# Patient Record
Sex: FEMALE | Race: WHITE | NOT HISPANIC OR LATINO | Employment: OTHER | RURAL
[De-identification: names, ages, dates, MRNs, and addresses within clinical notes are randomized per-mention and may not be internally consistent; named-entity substitution may affect disease eponyms.]

---

## 2024-06-18 ENCOUNTER — OFFICE VISIT (OUTPATIENT)
Dept: CARDIOLOGY | Facility: CLINIC | Age: 64
End: 2024-06-18
Payer: COMMERCIAL

## 2024-06-18 VITALS
WEIGHT: 179 LBS | OXYGEN SATURATION: 96 % | SYSTOLIC BLOOD PRESSURE: 106 MMHG | HEART RATE: 74 BPM | DIASTOLIC BLOOD PRESSURE: 58 MMHG | BODY MASS INDEX: 31.71 KG/M2 | HEIGHT: 63 IN

## 2024-06-18 DIAGNOSIS — R07.2 PRECORDIAL CHEST PAIN: Primary | ICD-10-CM

## 2024-06-18 DIAGNOSIS — I10 HYPERTENSION, ESSENTIAL: ICD-10-CM

## 2024-06-18 DIAGNOSIS — R06.02 SHORTNESS OF BREATH: ICD-10-CM

## 2024-06-18 PROCEDURE — 99204 OFFICE O/P NEW MOD 45 MIN: CPT | Performed by: NURSE PRACTITIONER

## 2024-06-18 PROCEDURE — 93000 ELECTROCARDIOGRAM COMPLETE: CPT | Performed by: NURSE PRACTITIONER

## 2024-06-18 RX ORDER — VENLAFAXINE HYDROCHLORIDE 75 MG/1
1 CAPSULE, EXTENDED RELEASE ORAL DAILY
COMMUNITY
Start: 2024-06-10

## 2024-06-18 RX ORDER — LOSARTAN POTASSIUM 50 MG/1
1 TABLET ORAL DAILY
COMMUNITY

## 2024-06-18 RX ORDER — PANTOPRAZOLE SODIUM 40 MG/1
1 TABLET, DELAYED RELEASE ORAL 2 TIMES DAILY
COMMUNITY

## 2024-06-18 RX ORDER — SEMAGLUTIDE 0.68 MG/ML
INJECTION, SOLUTION SUBCUTANEOUS WEEKLY
COMMUNITY

## 2024-06-18 NOTE — ASSESSMENT & PLAN NOTE
Patient reports an episode of severe chest pressure that radiated to her neck 4 days ago.  She was evaluated in the ER, workup unremarkable.  She has a family history of CAD.  - Nuclear treadmill stress test to rule out ischemia.

## 2024-06-18 NOTE — PROGRESS NOTES
[facility] Provider Note     Date:   2024   Name: Osvaldo Herrera  :   1960  PCP: Martin Epps MD    Chief Complaint   Patient presents with   • CARDIAC CONSULT       Subjective     History of Present Illness  Osvaldo Herrera is a 64 y.o. female with past medical history of, HTN, acid reflux and migraines who presents today for new patient evaluation for chest pain.  Patient experienced a severe episode of chest pain that started suddenly 4 days ago.  The pain radiated up to her neck and lasted approximately 30 minutes.  She was evaluated at [facility] ER, workup was unremarkable.  She is here today for further cardiac evaluation.  She has no previous history of heart disease.  She has a family history of CAD, her father passed away while having coronary bypass surgery.  She also reports worsening fatigue, she feels like she has no energy despite sleeping well at night.  She also reports worsening shortness of breath.  She is an ex-smoker.  No recent cardiac testing.  EKG today shows sinus rhythm with a rate of 71 bpm, low QRS voltage in extremity leads.  Patient has not had any recurrence of chest pain since the ER visit 4 days ago.    No specialty comments available.     Reports Denies   Chest Pain [x] []   Shortness of Air [x] []   Palpitations [] [x]   Edema [] [x]   Dizziness [x] []   Syncope [] [x]       No Known Allergies    Current Outpatient Medications:   •  CeleBREX 200 MG capsule, Take 1 capsule by mouth Daily., Disp: , Rfl:   •  losartan (COZAAR) 50 MG tablet, Take 1 tablet by mouth Daily., Disp: , Rfl:   •  pantoprazole (PROTONIX) 40 MG EC tablet, Take 1 tablet by mouth 2 (Two) Times a Day., Disp: , Rfl:   •  Semaglutide,0.25 or 0.5MG/DOS, (Ozempic, 0.25 or 0.5 MG/DOSE,) 2 MG/3ML solution pen-injector, Inject  under the skin into the appropriate area as directed 1 (One) Time Per Week., Disp: , Rfl:   •  venlafaxine XR (EFFEXOR-XR) 75 MG 24 hr  "capsule, Take 1 capsule by mouth Daily., Disp: , Rfl:     Past Medical History:   Diagnosis Date   • Acid reflux    • Gall stones    • Migraine headache       Past Surgical History:   Procedure Laterality Date   • CHOLECYSTECTOMY  2010   • HYSTERECTOMY  1996   • WRIST SURGERY  2016     Family History   Problem Relation Age of Onset   • Cancer Mother    • Heart disease Father    • Heart attack Father    • Parkinsonism Brother      Social History     Socioeconomic History   • Marital status:    Tobacco Use   • Smoking status: Former     Types: Cigarettes     Passive exposure: Past   • Smokeless tobacco: Never   Vaping Use   • Vaping status: Never Used   Substance and Sexual Activity   • Alcohol use: Not Currently   • Drug use: Not Currently   • Sexual activity: Defer       Objective     Vital Signs:  /58   Pulse 74   Ht 160 cm (63\")   Wt 81.2 kg (179 lb)   SpO2 96%   BMI 31.71 kg/m²   Estimated body mass index is 31.71 kg/m² as calculated from the following:    Height as of this encounter: 160 cm (63\").    Weight as of this encounter: 81.2 kg (179 lb).       BMI is >= 30 and <35. (Class 1 Obesity). The following options were offered after discussion;: exercise counseling/recommendations      Physical Exam  Vitals reviewed.   Constitutional:       Appearance: Normal appearance.   HENT:      Head: Normocephalic.   Cardiovascular:      Rate and Rhythm: Normal rate and regular rhythm.      Heart sounds: Normal heart sounds.   Pulmonary:      Effort: Pulmonary effort is normal.      Breath sounds: Normal breath sounds.   Musculoskeletal:      Right lower leg: No edema.      Left lower leg: No edema.   Skin:     General: Skin is warm and dry.      Capillary Refill: Capillary refill takes less than 2 seconds.   Neurological:      General: No focal deficit present.      Mental Status: She is alert and oriented to person, place, and time.   Psychiatric:         Mood and Affect: Mood normal.         Behavior: " Behavior normal.           Recent hospitalization notes reviewed: ER notes from [facility] reviewed      ECG 12 Lead    Date/Time: 6/18/2024 10:26 AM  Performed by: Ann Ram APRN    Authorized by: Ann Ram APRN  Comparison: not compared with previous ECG   Previous ECG: no previous ECG available  Rhythm: sinus rhythm  Rate: normal  BPM: 71  Other findings: low voltage    Clinical impression: non-specific ECG           Assessment and Plan     Diagnoses and all orders for this visit:    1. Precordial chest pain (Primary)  Assessment & Plan:  Patient reports an episode of severe chest pressure that radiated to her neck 4 days ago.  She was evaluated in the ER, workup unremarkable.  She has a family history of CAD.  - Nuclear treadmill stress test to rule out ischemia.    Orders:  -     Stress Test With Myocardial Perfusion One Day; Future  -     Adult Transthoracic Echo Complete W/ Cont if Necessary Per Protocol; Future    2. Hypertension, essential  Assessment & Plan:  Hypertension is stable and controlled  Continue current treatment regimen.  Regular aerobic exercise.  Blood pressure will be reassessed in 6 months.      3. Shortness of breath  Assessment & Plan:  Worsening shortness of breath and fatigue.   -Nuclear treadmill stress test and echocardiogram to rule out cardiac etiology     Orders:  -     Stress Test With Myocardial Perfusion One Day; Future  -     Adult Transthoracic Echo Complete W/ Cont if Necessary Per Protocol; Future    Other orders  -     ECG 12 Lead        Recommendations: ER if symptoms increase and Report if any new/changing symptoms immediately          Follow Up  Return in about 4 weeks (around 7/16/2024) for cardiac testing results.  Patient was given instructions and counseling regarding her condition or for health maintenance advice. Please see specific information pulled into the AVS if appropriate.

## 2024-06-18 NOTE — ASSESSMENT & PLAN NOTE
Worsening shortness of breath and fatigue.   -Nuclear treadmill stress test and echocardiogram to rule out cardiac etiology

## 2024-06-20 ENCOUNTER — PATIENT ROUNDING (BHMG ONLY) (OUTPATIENT)
Dept: CARDIOLOGY | Facility: CLINIC | Age: 64
End: 2024-06-20
Payer: COMMERCIAL

## 2024-06-20 NOTE — PROGRESS NOTES
..My name is Janay Reno and I am the Referral Coordinator for [facility] [place].    I would like to thank you for being a loyal patient. If you do not mind I would like to ask you a few questions about your recent visit with us.  Please feel free to reply if you wish to provide us with feedback on your first visit with our practice.    First, could you tell me what went well with your recent visit?    Secondly, we are always looking for ways to make our patients' experiences even better.  Do you have any recommendations on ways we may improve?    Finally, overall were you satisfied with your first visit to us as a [facility] facility?    In the next few days, you will be receiving a Patient Experience Survey.      Thank you for taking the time to answer a few questions today.  I hope you have a good day.

## 2024-06-24 ENCOUNTER — OUTSIDE FACILITY SERVICE (OUTPATIENT)
Dept: CARDIOLOGY | Facility: CLINIC | Age: 64
End: 2024-06-24
Payer: COMMERCIAL

## 2024-06-25 ENCOUNTER — HOSPITAL ENCOUNTER (OUTPATIENT)
Dept: CARDIOLOGY | Facility: HOSPITAL | Age: 64
Discharge: HOME | End: 2024-06-25

## 2024-06-25 DIAGNOSIS — R07.2 PRECORDIAL CHEST PAIN: ICD-10-CM

## 2024-06-25 DIAGNOSIS — R06.02 SHORTNESS OF BREATH: ICD-10-CM

## 2024-07-09 ENCOUNTER — OFFICE VISIT (OUTPATIENT)
Dept: CARDIOLOGY | Facility: CLINIC | Age: 64
End: 2024-07-09
Payer: COMMERCIAL

## 2024-07-09 VITALS
WEIGHT: 186 LBS | HEIGHT: 63 IN | BODY MASS INDEX: 32.96 KG/M2 | OXYGEN SATURATION: 97 % | SYSTOLIC BLOOD PRESSURE: 118 MMHG | HEART RATE: 71 BPM | DIASTOLIC BLOOD PRESSURE: 76 MMHG

## 2024-07-09 DIAGNOSIS — R07.2 PRECORDIAL CHEST PAIN: Primary | ICD-10-CM

## 2024-07-09 DIAGNOSIS — I10 HYPERTENSION, ESSENTIAL: ICD-10-CM

## 2024-07-09 PROCEDURE — 99214 OFFICE O/P EST MOD 30 MIN: CPT | Performed by: NURSE PRACTITIONER

## 2024-07-09 RX ORDER — ELETRIPTAN HYDROBROMIDE 40 MG/1
40 TABLET, FILM COATED ORAL ONCE AS NEEDED
COMMUNITY
Start: 2024-07-05

## 2024-07-09 NOTE — PROGRESS NOTES
[facility] Provider Note     Date:   2024   Name: Osvaldo Herrera  :   1960  PCP: Martin Epps MD    Chief Complaint   Patient presents with    Follow-up     ECHO/MARGO RESULTS       Subjective     History of Present Illness  Osvaldo Herrera is a 64 y.o. female with past medical history of, HTN, acid reflux and migraines who presents today for follow-up after recent cardiac testing for chest pain.  She completed a nuclear stress test on 2024 that revealed no evidence of ischemia.  Echocardiogram today shows an LVEF of 61-65%, normal RVSP, mild mitral regurgitation and mild tricuspid regurgitation.  She reports that she had a very brief episode of chest discomfort since her last office visit, it was not as severe as her previous episode.  It only lasted approximately 20-30 seconds and then resolved.  She denies any new symptoms since her last office visit.  Results of cardiac testing reviewed with patient in detail.  Overall, she is doing well today with no new concerns at this time.      Nuclear stress test 2024-no scintigraphic evidence of ischemia.    Echocardiogram 2024-LVEF 61-65%.  Normal RVSP.  Mild mitral regurgitation and mild tricuspid regurgitation      No Known Allergies    Current Outpatient Medications:     CeleBREX 200 MG capsule, Take 1 capsule by mouth Daily., Disp: , Rfl:     eletriptan (RELPAX) 40 MG tablet, Take 1 tablet by mouth 1 (One) Time As Needed., Disp: , Rfl:     losartan (COZAAR) 50 MG tablet, Take 1 tablet by mouth Daily., Disp: , Rfl:     pantoprazole (PROTONIX) 40 MG EC tablet, Take 1 tablet by mouth 2 (Two) Times a Day., Disp: , Rfl:     Semaglutide,0.25 or 0.5MG/DOS, (Ozempic, 0.25 or 0.5 MG/DOSE,) 2 MG/3ML solution pen-injector, Inject  under the skin into the appropriate area as directed 1 (One) Time Per Week., Disp: , Rfl:     venlafaxine XR (EFFEXOR-XR) 75 MG 24 hr capsule, Take 1 capsule by mouth Daily., Disp: , Rfl:  "    Past Medical History:   Diagnosis Date    Acid reflux     Gall stones     Migraine headache       Past Surgical History:   Procedure Laterality Date    CHOLECYSTECTOMY  2010    HYSTERECTOMY  1996    WRIST SURGERY  2016     Family History   Problem Relation Age of Onset    Cancer Mother     Heart disease Father     Heart attack Father     Parkinsonism Brother      Social History     Socioeconomic History    Marital status:    Tobacco Use    Smoking status: Former     Types: Cigarettes     Passive exposure: Past    Smokeless tobacco: Never   Vaping Use    Vaping status: Never Used   Substance and Sexual Activity    Alcohol use: Not Currently    Drug use: Not Currently    Sexual activity: Defer       Objective     Vital Signs:  /76   Pulse 71   Ht 160 cm (63\")   Wt 84.4 kg (186 lb)   SpO2 97%   BMI 32.95 kg/m²   Estimated body mass index is 32.95 kg/m² as calculated from the following:    Height as of this encounter: 160 cm (63\").    Weight as of this encounter: 84.4 kg (186 lb).               Physical Exam  Vitals reviewed.   Constitutional:       Appearance: Normal appearance.   HENT:      Head: Normocephalic.   Musculoskeletal:      Right lower leg: No edema.      Left lower leg: No edema.   Skin:     General: Skin is warm and dry.      Capillary Refill: Capillary refill takes less than 2 seconds.   Neurological:      General: No focal deficit present.      Mental Status: She is alert and oriented to person, place, and time.   Psychiatric:         Mood and Affect: Mood normal.         Behavior: Behavior normal.           Cardiology studies reviewed: Nuclear stress test and echo reviewed          Assessment and Plan     Diagnoses and all orders for this visit:    1. Precordial chest pain (Primary)  Assessment & Plan:   She completed a nuclear stress test on 6/24/2024 that revealed no evidence of ischemia.  Echocardiogram today shows an LVEF of 61-65%, normal RVSP, mild mitral regurgitation and " mild tricuspid regurgitation.  She had a very brief episode of chest discomfort since her last office visit but it was not as severe as previous episode.  It only lasted approximately 20-30 seconds and then resolved.    Results of stress test and echo discussed in detail with patient.  We also discussed further evaluation with a coronary CTA if chest pain continues.  She will let me know if symptoms worsen and we will proceed with a coronary CTA.  - Follow-up in 6 months to reassess symptoms.      2. Hypertension, essential  Assessment & Plan:  Hypertension is stable and controlled  Continue current treatment regimen.  Regular aerobic exercise.  Blood pressure will be reassessed in 6 months.          Recommendations: Report if any new/changing symptoms immediately          Follow Up  Return in about 6 months (around 1/9/2025).  Patient was given instructions and counseling regarding her condition or for health maintenance advice. Please see specific information pulled into the AVS if appropriate.

## 2024-07-09 NOTE — ASSESSMENT & PLAN NOTE
She completed a nuclear stress test on 6/24/2024 that revealed no evidence of ischemia.  Echocardiogram today shows an LVEF of 61-65%, normal RVSP, mild mitral regurgitation and mild tricuspid regurgitation.  She had a very brief episode of chest discomfort since her last office visit but it was not as severe as previous episode.  It only lasted approximately 20-30 seconds and then resolved.    Results of stress test and echo discussed in detail with patient.  We also discussed further evaluation with a coronary CTA if chest pain continues.  She will let me know if symptoms worsen and we will proceed with a coronary CTA.  - Follow-up in 6 months to reassess symptoms.

## 2025-01-13 ENCOUNTER — OFFICE VISIT (OUTPATIENT)
Dept: CARDIOLOGY | Facility: CLINIC | Age: 65
End: 2025-01-13
Payer: COMMERCIAL

## 2025-01-13 ENCOUNTER — PATIENT ROUNDING (BHMG ONLY) (OUTPATIENT)
Dept: CARDIOLOGY | Facility: CLINIC | Age: 65
End: 2025-01-13

## 2025-01-13 VITALS
WEIGHT: 192 LBS | HEIGHT: 63 IN | HEART RATE: 76 BPM | SYSTOLIC BLOOD PRESSURE: 110 MMHG | OXYGEN SATURATION: 97 % | BODY MASS INDEX: 34.02 KG/M2 | DIASTOLIC BLOOD PRESSURE: 72 MMHG

## 2025-01-13 DIAGNOSIS — I10 HYPERTENSION, ESSENTIAL: ICD-10-CM

## 2025-01-13 DIAGNOSIS — I38 VALVULAR REGURGITATION: Primary | ICD-10-CM

## 2025-01-13 PROCEDURE — 99213 OFFICE O/P EST LOW 20 MIN: CPT | Performed by: NURSE PRACTITIONER

## 2025-01-13 RX ORDER — MELOXICAM 15 MG/1
15 TABLET ORAL DAILY
COMMUNITY
Start: 2024-12-12

## 2025-01-13 RX ORDER — TRAMADOL HYDROCHLORIDE 50 MG/1
50 TABLET ORAL EVERY 6 HOURS PRN
COMMUNITY
Start: 2025-01-10

## 2025-01-13 RX ORDER — ASPIRIN 81 MG/1
81 TABLET ORAL DAILY
COMMUNITY

## 2025-01-13 NOTE — ASSESSMENT & PLAN NOTE
Hypertension is stable and controlled  Continue current treatment regimen.  Regular aerobic exercise.  Blood pressure will be reassessed in 1 year

## 2025-01-13 NOTE — PROGRESS NOTES
..My name is Fifi Isaacs and I am the Practice Manager for [facility] [place].    I would like to thank you for being a loyal patient. If you do not mind I would like to ask you a few questions about your recent visit with us.  Please feel free to reply if you wish to provide us with feedback on your first visit with our practice.    First, could you tell me what went well with your recent visit?    Secondly, we are always looking for ways to make our patients' experiences even better.  Do you have any recommendations on ways we may improve?    Finally, overall were you satisfied with your first visit to us as a [facility] facility?    In the next few days, you will be receiving a Patient Experience Survey.      Thank you for taking the time to answer a few questions today.  I hope you have a good day.

## 2025-01-13 NOTE — ASSESSMENT & PLAN NOTE
Mild mitral and tricuspid regurgitation noted on echocardiogram from 7/9/2024.  Currently asymptomatic.

## 2025-01-13 NOTE — PROGRESS NOTES
[facility] Provider Note     Date:   2025   Name: Osvaldo Herrera  :   1960  PCP: Martin Epps MD    Chief Complaint   Patient presents with    Hypertension       Subjective     History of Present Illness  Osvaldo Herrera is a 64 y.o. female who presents today for annual follow-up visit.  She completed a cardiac workup last year for chest pain.  Nuclear stress test on 2024 revealed no evidence of ischemia.  Echocardiogram showed an LVEF of 61 to 65%, normal RVSP, mild mitral regurgitation and mild tricuspid regurgitation.  She has not had any recurrence of chest pain since her last office visit.  She denies any current cardiac symptoms.  She denies chest pain, shortness of breath, palpitations, dizziness, lower extremity edema or syncope.  She recently had knee replacement surgery and is doing well with that.  Overall, she is doing well today with no new concerns or symptoms at this time.    Nuclear stress test 2024-no scintigraphic evidence of ischemia.    Echocardiogram 2024-LVEF 61-65%.  Normal RVSP.  Mild mitral regurgitation and mild tricuspid regurgitation     Reports Denies   Chest Pain [] [x]   Shortness of Air [] [x]   Palpitations [] [x]   Edema [] [x]   Dizziness [] [x]   Syncope [] [x]       No Known Allergies    Current Outpatient Medications:     aspirin 81 MG EC tablet, Take 1 tablet by mouth Daily., Disp: , Rfl:     meloxicam (MOBIC) 15 MG tablet, Take 1 tablet by mouth Daily., Disp: , Rfl:     pantoprazole (PROTONIX) 40 MG EC tablet, Take 1 tablet by mouth 2 (Two) Times a Day., Disp: , Rfl:     traMADol (ULTRAM) 50 MG tablet, Take 1 tablet by mouth Every 6 (Six) Hours As Needed., Disp: , Rfl:     Past Medical History:   Diagnosis Date    Acid reflux     Gall stones     Migraine headache       Past Surgical History:   Procedure Laterality Date    CHOLECYSTECTOMY      HYSTERECTOMY      WRIST SURGERY  2016     Family History   Problem  "Relation Age of Onset    Cancer Mother     Heart disease Father     Heart attack Father     Parkinsonism Brother      Social History     Socioeconomic History    Marital status:    Tobacco Use    Smoking status: Former     Types: Cigarettes     Passive exposure: Past    Smokeless tobacco: Never   Vaping Use    Vaping status: Never Used   Substance and Sexual Activity    Alcohol use: Not Currently    Drug use: Not Currently    Sexual activity: Defer       Objective     Vital Signs:  /72   Pulse 76   Ht 160 cm (63\")   Wt 87.1 kg (192 lb)   SpO2 97%   BMI 34.01 kg/m²   Estimated body mass index is 34.01 kg/m² as calculated from the following:    Height as of this encounter: 160 cm (63\").    Weight as of this encounter: 87.1 kg (192 lb).               Physical Exam  Vitals reviewed.   Constitutional:       Appearance: Normal appearance.   HENT:      Head: Normocephalic.   Cardiovascular:      Rate and Rhythm: Normal rate and regular rhythm.      Heart sounds: Normal heart sounds.   Pulmonary:      Effort: Pulmonary effort is normal.      Breath sounds: Normal breath sounds.   Musculoskeletal:      Right lower leg: No edema.      Left lower leg: No edema.   Skin:     General: Skin is warm and dry.      Capillary Refill: Capillary refill takes less than 2 seconds.   Neurological:      General: No focal deficit present.      Mental Status: She is alert and oriented to person, place, and time.   Psychiatric:         Mood and Affect: Mood normal.         Behavior: Behavior normal.                     Assessment and Plan     Diagnoses and all orders for this visit:    1. Valvular regurgitation (Primary)  Assessment & Plan:  Mild mitral and tricuspid regurgitation noted on echocardiogram from 7/9/2024.  Currently asymptomatic.        2. Hypertension, essential  Assessment & Plan:  Hypertension is stable and controlled  Continue current treatment regimen.  Regular aerobic exercise.  Blood pressure will be " reassessed in 1 year           Recommendations: Report if any new/changing symptoms immediately          Follow Up  Return in about 1 year (around 1/13/2026).  Patient was given instructions and counseling regarding her condition or for health maintenance advice. Please see specific information pulled into the AVS if appropriate.